# Patient Record
Sex: FEMALE | Race: BLACK OR AFRICAN AMERICAN | Employment: FULL TIME | URBAN - NONMETROPOLITAN AREA
[De-identification: names, ages, dates, MRNs, and addresses within clinical notes are randomized per-mention and may not be internally consistent; named-entity substitution may affect disease eponyms.]

---

## 2021-04-07 ENCOUNTER — APPOINTMENT (OUTPATIENT)
Dept: CT IMAGING | Age: 31
End: 2021-04-07
Payer: COMMERCIAL

## 2021-04-07 ENCOUNTER — APPOINTMENT (OUTPATIENT)
Dept: GENERAL RADIOLOGY | Age: 31
End: 2021-04-07
Payer: COMMERCIAL

## 2021-04-07 ENCOUNTER — HOSPITAL ENCOUNTER (EMERGENCY)
Age: 31
Discharge: HOME | End: 2021-04-07
Attending: INTERNAL MEDICINE
Payer: COMMERCIAL

## 2021-04-07 VITALS
DIASTOLIC BLOOD PRESSURE: 60 MMHG | HEIGHT: 63 IN | BODY MASS INDEX: 23.92 KG/M2 | WEIGHT: 135 LBS | SYSTOLIC BLOOD PRESSURE: 101 MMHG | RESPIRATION RATE: 20 BRPM | HEART RATE: 77 BPM | TEMPERATURE: 97.3 F | OXYGEN SATURATION: 100 %

## 2021-04-07 DIAGNOSIS — S93.402A SPRAIN OF LEFT ANKLE, UNSPECIFIED LIGAMENT, INITIAL ENCOUNTER: ICD-10-CM
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
## 2021-04-07 DIAGNOSIS — S09.90XA INJURY OF HEAD, INITIAL ENCOUNTER: Primary | ICD-10-CM

## 2021-04-07 LAB
ABSOLUTE EOS #: 0 K/UL (ref 0–0.4)
ABSOLUTE IMMATURE GRANULOCYTE: ABNORMAL K/UL (ref 0–0.3)
ABSOLUTE LYMPH #: 2.4 K/UL (ref 1–4.8)
ABSOLUTE MONO #: 0.3 K/UL (ref 0–1)
ALBUMIN SERPL-MCNC: 4 G/DL (ref 3.5–5.2)
ALBUMIN/GLOBULIN RATIO: NORMAL (ref 1–2.5)
ALP BLD-CCNC: 52 U/L (ref 35–104)
ALT SERPL-CCNC: 9 U/L (ref 5–33)
ANION GAP SERPL CALCULATED.3IONS-SCNC: 10 MMOL/L (ref 9–17)
AST SERPL-CCNC: 16 U/L
BASOPHILS # BLD: 0 % (ref 0–2)
BASOPHILS ABSOLUTE: 0 K/UL (ref 0–0.2)
BILIRUB SERPL-MCNC: 0.49 MG/DL (ref 0.3–1.2)
BUN BLDV-MCNC: 11 MG/DL (ref 6–20)
BUN/CREAT BLD: 16 (ref 9–20)
CALCIUM SERPL-MCNC: 8.9 MG/DL (ref 8.6–10.4)
CHLORIDE BLD-SCNC: 105 MMOL/L (ref 98–107)
CO2: 22 MMOL/L (ref 20–31)
CREAT SERPL-MCNC: 0.7 MG/DL (ref 0.5–0.9)
DIFFERENTIAL TYPE: YES
EOSINOPHILS RELATIVE PERCENT: 1 % (ref 0–5)
GFR AFRICAN AMERICAN: >60 ML/MIN
GFR NON-AFRICAN AMERICAN: >60 ML/MIN
GFR SERPL CREATININE-BSD FRML MDRD: NORMAL ML/MIN/{1.73_M2}
GFR SERPL CREATININE-BSD FRML MDRD: NORMAL ML/MIN/{1.73_M2}
GLUCOSE BLD-MCNC: 94 MG/DL (ref 70–99)
HCG QUALITATIVE: NEGATIVE
HCT VFR BLD CALC: 36.2 % (ref 36–46)
HEMOGLOBIN: 12 G/DL (ref 12–16)
IMMATURE GRANULOCYTES: ABNORMAL %
LYMPHOCYTES # BLD: 55 % (ref 15–40)
MCH RBC QN AUTO: 27 PG (ref 26–34)
MCHC RBC AUTO-ENTMCNC: 33.2 G/DL (ref 31–37)
MCV RBC AUTO: 81.5 FL (ref 80–100)
MONOCYTES # BLD: 7 % (ref 4–8)
NRBC AUTOMATED: ABNORMAL PER 100 WBC
PDW BLD-RTO: 14.7 % (ref 12.1–15.2)
PLATELET # BLD: 161 K/UL (ref 140–450)
PLATELET ESTIMATE: ABNORMAL
PMV BLD AUTO: ABNORMAL FL (ref 6–12)
POTASSIUM SERPL-SCNC: 3.8 MMOL/L (ref 3.7–5.3)
RBC # BLD: 4.44 M/UL (ref 4–5.2)
RBC # BLD: ABNORMAL 10*6/UL
SEG NEUTROPHILS: 37 % (ref 47–75)
SEGMENTED NEUTROPHILS ABSOLUTE COUNT: 1.6 K/UL (ref 2.5–7)
SODIUM BLD-SCNC: 137 MMOL/L (ref 135–144)
TOTAL PROTEIN: 6.7 G/DL (ref 6.4–8.3)
WBC # BLD: 4.3 K/UL (ref 3.5–11)
WBC # BLD: ABNORMAL 10*3/UL

## 2021-04-07 PROCEDURE — 84703 CHORIONIC GONADOTROPIN ASSAY: CPT

## 2021-04-07 PROCEDURE — 80053 COMPREHEN METABOLIC PANEL: CPT

## 2021-04-07 PROCEDURE — 85025 COMPLETE CBC W/AUTO DIFF WBC: CPT

## 2021-04-07 PROCEDURE — 73610 X-RAY EXAM OF ANKLE: CPT

## 2021-04-07 PROCEDURE — 99285 EMERGENCY DEPT VISIT HI MDM: CPT

## 2021-04-07 PROCEDURE — 2580000003 HC RX 258: Performed by: INTERNAL MEDICINE

## 2021-04-07 PROCEDURE — 6370000000 HC RX 637 (ALT 250 FOR IP): Performed by: EMERGENCY MEDICINE

## 2021-04-07 PROCEDURE — 72072 X-RAY EXAM THORAC SPINE 3VWS: CPT

## 2021-04-07 PROCEDURE — 72050 X-RAY EXAM NECK SPINE 4/5VWS: CPT

## 2021-04-07 PROCEDURE — 70450 CT HEAD/BRAIN W/O DYE: CPT

## 2021-04-07 RX ORDER — CEPHALEXIN 500 MG/1
500 CAPSULE ORAL 3 TIMES DAILY
COMMUNITY

## 2021-04-07 RX ORDER — ONDANSETRON 2 MG/ML
4 INJECTION INTRAMUSCULAR; INTRAVENOUS ONCE
Status: DISCONTINUED | OUTPATIENT
Start: 2021-04-07 | End: 2021-04-07 | Stop reason: HOSPADM

## 2021-04-07 RX ORDER — 0.9 % SODIUM CHLORIDE 0.9 %
1000 INTRAVENOUS SOLUTION INTRAVENOUS ONCE
Status: COMPLETED | OUTPATIENT
Start: 2021-04-07 | End: 2021-04-07

## 2021-04-07 RX ORDER — ACETAMINOPHEN 325 MG/1
650 TABLET ORAL ONCE
Status: COMPLETED | OUTPATIENT
Start: 2021-04-07 | End: 2021-04-07

## 2021-04-07 RX ADMIN — ACETAMINOPHEN 650 MG: 325 TABLET, FILM COATED ORAL at 10:11

## 2021-04-07 RX ADMIN — SODIUM CHLORIDE 1000 ML: 9 INJECTION, SOLUTION INTRAVENOUS at 08:00

## 2021-04-07 ASSESSMENT — PAIN SCALES - GENERAL
PAINLEVEL_OUTOF10: 6
PAINLEVEL_OUTOF10: 10
PAINLEVEL_OUTOF10: 10

## 2021-04-07 ASSESSMENT — PAIN DESCRIPTION - ORIENTATION: ORIENTATION: LEFT

## 2021-04-07 ASSESSMENT — PAIN DESCRIPTION - LOCATION: LOCATION: ANKLE;BACK

## 2021-04-07 ASSESSMENT — PAIN DESCRIPTION - PAIN TYPE: TYPE: ACUTE PAIN

## 2021-04-07 NOTE — ED PROVIDER NOTES
Summation      Patient Course: Patient's care was turned over to me by . Patient was at work at [employer], patient tripped over a pallet and fell patient hit her head. Loss of consciousness was reported after a fall. Patient had also had some nausea. Presently patient is awake and alert and feels better. CT head and neck are negative. X-ray of the T-spine is negative. L Ankle x-ray is also negative. Patient will be sent home. The warning signs were discussed. Return to ED if worse. ED Medications administered this visit:    Medications   ondansetron (ZOFRAN) injection 4 mg (has no administration in time range)   0.9 % sodium chloride bolus (has no administration in time range)       New Prescriptions from this visit:    New Prescriptions    No medications on file       Follow-up:  No follow-up provider specified. Final Impression:   1. Injury of head, initial encounter    2. Fall, initial encounter    3.  Sprain of left ankle, unspecified ligament, initial encounter               (Please note that portions of this note were completed with a voice recognition program.  Efforts were made to edit the dictations but occasionally words are mis-transcribed.)      Shabnam Brice MD  04/07/21 5202

## 2021-04-07 NOTE — ED NOTES
Spoke with Alejandrina Leigh, nurse at [facility]. 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 We have occupational health here at [place] [address] on [address] at noon, so if Nayana Duck can get back here and see them before going home that would be great. \"  Alejandrina Leigh mentions to also complete workers compensation paperwork and everything that goes with it.       Radha Mora, AKIL  04/07/21 5716

## 2021-04-07 NOTE — ED PROVIDER NOTES
[facility] ED  EMERGENCY DEPARTMENT ENCOUNTER      Pt Name: Orville Laureano  MRN: [id number]  [place] 1990  Date of evaluation: 4/7/2021  Provider: Anika Ayoub MD    CHIEF COMPLAINT       Chief Complaint   Patient presents with    Fall     Pt tripped and fell while at work at [employer]. Pt has pain to her left ankle.  Ankle Pain         HISTORY OF PRESENT ILLNESS   (Location/Symptom, Timing/Onset, Context/Setting, Quality, Duration, Modifying Factors, Severity)  Note limiting factors. Orville Laureano is a 36 y.o. female who has a history of anemia presents to the emergency department for evaluation and management of fall and head injury which occurred at [employer] during her work shift. She was reported to have lost consciousness and vomited twice after recovery of consciousness en route by EMS. Zofran was given. Patient arrived immobilized and reported a headache, neck pain, upper back pain but nausea resolved. When asked, she reported that she tripped and fell backwards. She hit her head on some equipment. She stated that otherwise she was well and had no prior symptoms. Other details were unclear. A coworker was accompanying her. This patient is being evaluated during the COVID-19 pandemic. HPI    Nursing Notes were reviewed. REVIEW OF SYSTEMS    (2-9 systems for level 4, 10 or more for level 5)       REVIEW OF SYSTEMS    Constitutional: Negative for fatigue and fever. HENT: Negative for dental problem, ear pain, ear drainage, nasal drainage, epistaxis and sore throat. Eyes: Negative for pain, visual changes and redness. Respiratory: Negative for  chest tightness and shortness of breath. Cardiovascular: Negative for chest pain, palpitations and leg swelling. Gastrointestinal: Nausea  Resolved. Negative for abdominal distention, abdominal pain, diarrhea, nausea and vomiting. Genitourinary: Negative for difficulty urinating, dysuria, hematuria and urgency. Musculoskeletal: Positive for left ankle pain, neck pain, upper back pain, Negative for other arthralgias  Skin: Negative for wound, laceration, color change, pallor, rash   Allergic/Immunologic: Negative for environmental allergies and food allergies. Neurological: Positive for head injury, loss of consciousness, headache, negative dizziness, speech difficulty, weakness, distal tingling/numbness   Hematological: Negative for adenopathy. Does not bruise/bleed easily. Psychiatric/Behavioral: Negative for agitation, anxiety, depression, behavioral problems, confusion, hallucinations and suicidal ideas. All other systems reviewed and are negative. Except as noted above the remainder of the review of systems was reviewed and negative. PASTMEDICAL HISTORY     Past Medical History:   Diagnosis Date    Anemia          SURGICAL HISTORY       Past Surgical History:   Procedure Laterality Date     SECTION           CURRENT MEDICATIONS       Discharge Medication List as of 2021 10:51 AM      CONTINUE these medications which have NOT CHANGED    Details   cephALEXin (KEFLEX) 500 MG capsule Take 500 mg by mouth 3 times dailyHistorical Med             ALLERGIES     Patient has no known allergies. FAMILY HISTORY     History reviewed. No pertinent family history.        SOCIAL HISTORY       Social History     Socioeconomic History    Marital status: Single     Spouse name: None    Number of children: None    Years of education: None    Highest education level: None   Occupational History    None   Social Needs    Financial resource strain: None    Food insecurity     Worry: None     Inability: None    Transportation needs     Medical: None     Non-medical: None   Tobacco Use    Smoking status: Never Smoker    Smokeless tobacco: Never Used   Substance and Sexual Activity    Alcohol use: None    Drug use: None    Sexual activity: Yes     Partners: Male   Lifestyle    Physical activity Days per week: None     Minutes per session: None    Stress: None   Relationships    Social connections     Talks on phone: None     Gets together: None     Attends Jewish service: None     Active member of club or organization: None     Attends meetings of clubs or organizations: None     Relationship status: None    Intimate partner violence     Fear of current or ex partner: None     Emotionally abused: None     Physically abused: None     Forced sexual activity: None   Other Topics Concern    None   Social History Narrative    None       SCREENINGS    [place]on Coma Scale  Eye Opening: Spontaneous  Best Verbal Response: Oriented  Best Motor Response: Obeys commands  Zebulon Coma Scale Score: 15        PHYSICAL EXAM    (up to 7 for level 4, 8 or more for level 5)     ED Triage Vitals [04/07/21 0749]   BP Temp Temp Source Pulse Resp SpO2 Height Weight   104/69 97.3 °F (36.3 °C) Temporal 78 18 97 % 5' 3\" (1.6 m) 135 lb (61.2 kg)       Physical Exam  Physical Exam   Constitutional:  Appears well-developed and well-nourished. No distress noted. Non toxic in appearance. HENT:     Head: Normocephalic and atraumatic. Right Ear: External ear normal. TM normal pearly light reflex without hemotympanum, otorrhea, mastoid tenderness, Sparrow sign. Left Ear: External ear normal.  TM normal pearly light reflex without hemotympanum, otorrhea, mastoid tenderness, Sparrow sign. Nose: Nose normal.     Mouth/Throat: Oropharynx is clear and mucosa moist. No oropharyngeal exudate noted. Posterior pharynx is pink and noninjected. Eyes: Conjunctivae and EOM are normal. Pupils are equal, round, and reactive to light. No scleral icterus. No tearing or drainage. Examination of the fundi show crisp optic cups without any is evidence of papilledema. Neck: immobilized with hard c-collar. No tracheal deviation present. Mild spinous tenderness but no step-offs identified on palpation of the lower spine.  There is no paraspinous tenderness. No neck pain with axial loading. Cardiovascular: Normal rate, regular rhythm, normal heart sounds and intact distal pulses. Exam reveals no gallop or friction rub. No murmur heard. Pulmonary/Chest: Effort normal and breath sounds are symmetric and normal. No respiratory distress. There are no wheezes, rales or rhonchi. No tenderness is exhibited upon palpation of the chest wall. Abdominal: Soft. Bowel sounds are normal. No distension or no mass exhibitted. There is mild RLQ tenderness, without rebound, rigidity or guarding. Genitourinary:   No CVA tenderness noted on examination. Musculoskeletal: There is mild mid thoracic spinous tenderness but no step-offs identified on palpation. No paraspinous tenderness. Normal range of motion. No edema, wounds or deformity. No pelvic tenderness with palpation or compression. No leg length discrepancy. No leg foreshortening. Exam of the lower extremities demonstrates tenderness and swelling of the left lateral malleolus. Normal ROM of lower extremities. Lymphadenopathy:  No cervical or inguinal adenopathy. Neurological:   Alert and oriented to person, place, and time. Reflexes are normal.  There are no cranial nerve deficits. Normal muscle tone, motor and sensory function exhibitted. Strength 5/5 in all extremities and torso. Coordination normal. Gait not yet tested. Skin: Skin is warm and dry. No rash or wounds (superficial or open) noted. No diaphoresis. No erythema. No pallor. Psychiatric:  normal mood and affect. Behavior is normal. Judgment and thought content normal.     DIAGNOSTIC RESULTS     EKG: All EKG's are interpreted by the Emergency Department Physician who either signs or Co-signs this chart in the absence of a cardiologist.    Not indicated.     RADIOLOGY:   Non-plain film images such as CT, Ultrasoundand MRI are read by the radiologist. Plain radiographic images are visualized and preliminarily interpreted by the emergency physician with the below findings:    Not indicated. Interpretation per the Radiologist below, if available at the time of this note:    XR CERVICAL SPINE (4-5 VIEWS)   Final Result      Negative cervical spine x-rays. XR THORACIC SPINE (3 VIEWS)   Final Result      Negative x-rays of the thoracic spine. XR ANKLE LEFT (MIN 3 VIEWS)   Final Result      Negative x-rays left ankle. CT Head WO Contrast   Final Result      No intracranial hemorrhage. Negative brain CT. No skull fracture. ED BEDSIDE ULTRASOUND:   Performed by ED Physician - none    LABS:  Labs Reviewed   CBC WITH AUTO DIFFERENTIAL - Abnormal; Notable for the following components:       Result Value    Seg Neutrophils 37 (*)     Lymphocytes 55 (*)     Segs Absolute 1.60 (*)     All other components within normal limits   HCG, SERUM, QUALITATIVE   COMPREHENSIVE METABOLIC PANEL W/ REFLEX TO MG FOR LOW K       All other labs were within normal range or not returned as of this dictation. EMERGENCY DEPARTMENT COURSE and DIFFERENTIAL DIAGNOSIS/MDM:   Vitals:    Vitals:    04/07/21 7679 04/07/21 0933 04/07/21 0948 04/07/21 1003   BP: 98/60 99/60 (!) 92/52 101/60   Pulse: 57 63 65 77   Resp: 13 15 13 20   Temp:       TempSrc:       SpO2: 100% 100% 98% 100%   Weight:       Height:           Noted    MDM    CRITICAL CARE TIME   Total Critical Care time was 10 minutes, excluding separately reportable procedures. This included evaluation of data, bedside patient evaluation and care, contact and communication with other providers and hospitals and contact and communication with family. There was a high probability of clinically significant/ life threatening deterioration in the patient's condition which required my urgent intervention. [facility]  ED Course as of Apr 07 1545 Wed Apr 07, 2021   0755 Pt was log rolled with maintenance of spine alignment to examine back and spine.  Tenderness was elicted over mid thoracic spinous processes and lower cervical spine. No deformities appreciated. No pain or sensory changes with these maneuvers. No visible injuries - lacerations, abrasions. No CVA tenderness. Back board removed. C-collar maintained. [MS]   0805 Patient who is heading over to radiology was signed out to Dr. Suzanna Donis with plans to reexamine her for lower abdominal pain, follow labs, including HCG, follow imaging results and pursue other evaluations as indicated. [MS]      ED Course User Index  [MS] Yojana Johnson MD       CONSULTS:  None    PROCEDURES:  Unless otherwise noted below, none     Procedures      Summation      Prabhakar Duff is a 36 y.o. female who has a history of anemia was brought by EMS for evaluation after hitting her head and losing consciousness at [facility] during her work shift. She reported tripping which led to the fall. Spine examined demonstrating cervical and thoracic tenderness. imaging pending but backboard removed while c-spine remains immobilized. Patient appears to have a mild concussion but sensorium normal upon arrival.    Care turned over to daytime physician Dr. Suzanna Donis with labwork (evaluate for anemia, pregnancy or other potential causes for fall) and imaging studies pending. HCG pending as well as is plan to reexamine patient for abdominal pain and pursue further imaging as indicated. Patient Course:   ED Course as of Apr 07 1545   Wed Apr 07, 2021   0755 Pt was log rolled with maintenance of spine alignment to examine back and spine. Tenderness was elicted over mid thoracic spinous processes and lower cervical spine. No deformities appreciated. No pain or sensory changes with these maneuvers. No visible injuries - lacerations, abrasions. No CVA tenderness. Back board removed. C-collar maintained.     [MS]   0805 Patient who is heading over to radiology was signed out to Dr. Suzanna Donis with plans to reexamine her for lower abdominal pain, follow labs,

## 2021-04-07 NOTE — ED NOTES
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       Margarita Ortega RN  04/07/21 3348